# Patient Record
Sex: MALE | Race: OTHER | NOT HISPANIC OR LATINO
[De-identification: names, ages, dates, MRNs, and addresses within clinical notes are randomized per-mention and may not be internally consistent; named-entity substitution may affect disease eponyms.]

---

## 2022-01-12 ENCOUNTER — APPOINTMENT (OUTPATIENT)
Dept: UROLOGY | Facility: CLINIC | Age: 57
End: 2022-01-12
Payer: COMMERCIAL

## 2022-01-12 VITALS — BODY MASS INDEX: 29.73 KG/M2 | WEIGHT: 185 LBS | HEIGHT: 66 IN

## 2022-01-12 DIAGNOSIS — R53.83 OTHER FATIGUE: ICD-10-CM

## 2022-01-12 DIAGNOSIS — R79.89 OTHER SPECIFIED ABNORMAL FINDINGS OF BLOOD CHEMISTRY: ICD-10-CM

## 2022-01-12 PROCEDURE — 99203 OFFICE O/P NEW LOW 30 MIN: CPT

## 2022-01-12 NOTE — ASSESSMENT
[FreeTextEntry1] : testosterone normal\par can follow up with PMD to look up other reasons for low energy\par continue sildenfil with PMD\par PCa screening with PMD

## 2022-01-12 NOTE — HISTORY OF PRESENT ILLNESS
[FreeTextEntry1] : This is a 56 year male who has DM, presents for evaluation for low testosterone with low energy, low sex drive and weak erections\par \par on review of labs, testosterone is normal \par states some stress at home\par \par Oct 2021-- Total T = 606\par PSA = 1.0\par Glucose - 103\par Creatine - 0.97\par UA - no blood \par \par ED treated with sildenafil with sildenafil

## 2023-03-27 ENCOUNTER — APPOINTMENT (OUTPATIENT)
Dept: ORTHOPEDIC SURGERY | Facility: CLINIC | Age: 58
End: 2023-03-27
Payer: COMMERCIAL

## 2023-03-27 PROCEDURE — 99214 OFFICE O/P EST MOD 30 MIN: CPT

## 2023-03-27 NOTE — HISTORY OF PRESENT ILLNESS
[de-identified] : Chief complaint: LT shoulder pain\par \par 57 year old male presents with acute exacerbation of chronic LT shoulder pain. RT hand dominant. Denies any trauma. Occupation: [employer]. States he first started experiencing pain in 08/2022 but this resolved. He points to the deltoid as the location of pain. Describes pain with rest as a 3 and a 1 with activity on the pain scale. Admits to numbness/tingling in the fingers. Pain radiates to the neck. Taking OTC Tylenol for pain relief.  Tried some home exercises with no relief\par H/o diabetes, hypertension, and hypercholesterolemia. Taking metformin, losartan, tresiba, trulicity, simvastatin, and latanoprost. H/o LT collar bone fracture when he was a child. States his last H-A1C was 6.3 and he also has a glucose monitor. \par \par MRI of RT shoulder done with [facility] radiology, shows:\par 1. Full tear of the rotator cuff\par \par On exam left shoulder no tattoos, guarded range of motion pain and weakness with rotator cuff resistance positive impingement positive [place]'s left shoulder exam\par \par Recommending, discussed no treatment nonoperative and operative would like to have the surgery done he had an EKG at [facility] 3 weeks ago which was as was normal and we will schedule him for the left shoulder arthroscopy cuff repair decompression debridement possible open biceps tenodesis\par \par Surgical Discussion (general)\par \par The patient was advised of the diagnosis.  The natural history of the pathology was explained in full to the patient in layman's terms. All questions were answered.  The risks and benefits of surgical and non-surgical treatment alternatives were explained in full to the patient. \par \par The patient demonstrated a full understanding of the surgical and non-surgical options.  The risks of surgery were outlined in full to the patient including but not limited to bleeding, scarring, infection, sepsis, neurologic injury, vascular injury, failure to resolve symptoms, symptom recurrence, the need for further surgery, non-healing, wound breakdown, deep vein thrombosis, pulmonary embolism, spontaneous osteonecrosis, anesthesia complications and even death.  The patient understood all the risks and accepted them and understood that other complications could occur that are not mentioned above.  The intraoperative plan, post-operative plan, post-operative expectations and limitations were explained in full.  Expectations from non-surgical treatment were explained in full as well.  The patient demonstrated a complete understanding of the treatment alternatives and requested the above-mentioned procedure.  This will be scheduled accordingly.\par

## 2023-04-14 ENCOUNTER — APPOINTMENT (OUTPATIENT)
Dept: ORTHOPEDIC SURGERY | Facility: AMBULATORY SURGERY CENTER | Age: 58
End: 2023-04-14
Payer: COMMERCIAL

## 2023-04-14 PROCEDURE — 29827 SHO ARTHRS SRG RT8TR CUF RPR: CPT | Mod: LT

## 2023-04-14 PROCEDURE — 29826 SHO ARTHRS SRG DECOMPRESSION: CPT | Mod: LT

## 2023-04-14 PROCEDURE — 29823 SHO ARTHRS SRG XTNSV DBRDMT: CPT | Mod: LT,59

## 2023-04-14 RX ORDER — HYDROCODONE BITARTRATE AND ACETAMINOPHEN 5; 325 MG/1; MG/1
5-325 TABLET ORAL
Qty: 25 | Refills: 0 | Status: ACTIVE | COMMUNITY
Start: 2023-04-14 | End: 1900-01-01

## 2023-04-20 ENCOUNTER — APPOINTMENT (OUTPATIENT)
Dept: ORTHOPEDIC SURGERY | Facility: CLINIC | Age: 58
End: 2023-04-20

## 2023-04-28 ENCOUNTER — APPOINTMENT (OUTPATIENT)
Dept: ORTHOPEDIC SURGERY | Facility: CLINIC | Age: 58
End: 2023-04-28
Payer: COMMERCIAL

## 2023-04-28 PROCEDURE — 99024 POSTOP FOLLOW-UP VISIT: CPT

## 2023-04-28 NOTE — HISTORY OF PRESENT ILLNESS
[de-identified] : The patient is a 57-year-old male for reevaluation of his left shoulder.  He is status post left shoulder arthroscopy, arthroscopic rotator cuff repair, subacromial decompression and acromioplasty with extensive debridement of the glenohumeral joint, lysis of adhesions and complete synovectomy on 4/14/2023.  This is his initial postoperative visit.  His pain is well controlled.  He is wearing his sling.

## 2023-04-28 NOTE — IMAGING
[de-identified] : Physical exam of the left shoulder: The surgical portals are healing well.  Clean, dry, intact.  No surrounding erythema or drainage noted.  Range of motion was deferred today.  Intact to light touch and sensation.

## 2023-04-28 NOTE — DISCUSSION/SUMMARY
[de-identified] : Today the sutures were removed, Steri-Strips were placed.  His sling is discontinued.  He will start therapy for passive range of motion, pendulums and modalities.  I will see him back in 6 weeks for further evaluation and to advance his therapy to include rotator cuff and deltoid strengthening.\par \par Supervising Physician: Dr. Tay

## 2023-05-30 ENCOUNTER — RX RENEWAL (OUTPATIENT)
Age: 58
End: 2023-05-30

## 2023-06-12 ENCOUNTER — APPOINTMENT (OUTPATIENT)
Dept: ORTHOPEDIC SURGERY | Facility: CLINIC | Age: 58
End: 2023-06-12

## 2023-06-12 ENCOUNTER — APPOINTMENT (OUTPATIENT)
Dept: ORTHOPEDIC SURGERY | Facility: CLINIC | Age: 58
End: 2023-06-12
Payer: COMMERCIAL

## 2023-06-12 PROCEDURE — 99024 POSTOP FOLLOW-UP VISIT: CPT

## 2023-07-10 ENCOUNTER — NON-APPOINTMENT (OUTPATIENT)
Age: 58
End: 2023-07-10

## 2023-07-24 ENCOUNTER — NON-APPOINTMENT (OUTPATIENT)
Age: 58
End: 2023-07-24

## 2023-07-24 ENCOUNTER — APPOINTMENT (OUTPATIENT)
Dept: ORTHOPEDIC SURGERY | Facility: CLINIC | Age: 58
End: 2023-07-24
Payer: COMMERCIAL

## 2023-07-24 PROCEDURE — 99213 OFFICE O/P EST LOW 20 MIN: CPT

## 2023-07-24 RX ORDER — LATANOPROST/PF 0.005 %
0.01 DROPS OPHTHALMIC (EYE)
Qty: 8 | Refills: 0 | Status: ACTIVE | COMMUNITY
Start: 2023-05-28

## 2023-07-24 RX ORDER — VITAMIN B COMPLEX
CAPSULE ORAL
Qty: 90 | Refills: 0 | Status: ACTIVE | COMMUNITY
Start: 2023-04-19

## 2023-07-24 RX ORDER — BLOOD-GLUCOSE TRANSMITTER
EACH MISCELLANEOUS
Qty: 1 | Refills: 0 | Status: ACTIVE | COMMUNITY
Start: 2023-02-25

## 2023-07-24 RX ORDER — INSULIN DEGLUDEC INJECTION 100 U/ML
100 INJECTION, SOLUTION SUBCUTANEOUS
Qty: 15 | Refills: 0 | Status: ACTIVE | COMMUNITY
Start: 2023-06-26

## 2023-07-24 RX ORDER — BLOOD-GLUCOSE SENSOR
EACH MISCELLANEOUS
Qty: 3 | Refills: 0 | Status: ACTIVE | COMMUNITY
Start: 2022-08-29

## 2023-07-24 RX ORDER — PEN NEEDLE, DIABETIC 29 G X1/2"
32G X 4 MM NEEDLE, DISPOSABLE MISCELLANEOUS
Qty: 100 | Refills: 0 | Status: ACTIVE | COMMUNITY
Start: 2023-06-28

## 2023-07-24 NOTE — DISCUSSION/SUMMARY
[de-identified] : At this point I recommend he continues with formal physical therapy, Rx provided for that.  He will follow-up in 6 weeks for further evaluation with Dr. Tay.

## 2023-07-24 NOTE — IMAGING
[de-identified] : Physical exam of the left shoulder:  Active range of motion with forward flexion to 160 degrees, passive range of motion forward flexion to 170 degrees. Active range of motion abduction to 130 degrees, active range of motion internal rotation to L3.  No tenderness to palpation over the left AC joint or anterior aspect of the left shoulder.  Increased strength but still some weakness with rotator cuff resistance testing.

## 2023-07-24 NOTE — HISTORY OF PRESENT ILLNESS
[de-identified] : The patient is a 58-year-old male for reevaluation of his left shoulder. He is status post left shoulder arthroscopy, arthroscopic rotator cuff repair, subacromial decompression and acromioplasty with extensive debridement of the glenohumeral joint, lysis of adhesions and complete synovectomy on 4/14/2023.  He is a little over three months out from his surgery.  He gets stiffness in the shoulder.  He is doing formal physical therapy at [place].  The air conditioning bothers the shoulder.

## 2023-08-07 ENCOUNTER — RX RENEWAL (OUTPATIENT)
Age: 58
End: 2023-08-07

## 2023-09-11 ENCOUNTER — NON-APPOINTMENT (OUTPATIENT)
Age: 58
End: 2023-09-11

## 2023-09-11 ENCOUNTER — APPOINTMENT (OUTPATIENT)
Dept: ORTHOPEDIC SURGERY | Facility: CLINIC | Age: 58
End: 2023-09-11
Payer: COMMERCIAL

## 2023-09-11 DIAGNOSIS — S46.012D STRAIN OF MUSCLE(S) AND TENDON(S) OF THE ROTATOR CUFF OF LEFT SHOULDER, SUBSEQUENT ENCOUNTER: ICD-10-CM

## 2023-09-11 PROCEDURE — 99213 OFFICE O/P EST LOW 20 MIN: CPT

## 2023-10-30 ENCOUNTER — RX RENEWAL (OUTPATIENT)
Age: 58
End: 2023-10-30

## 2023-12-29 ENCOUNTER — RX RENEWAL (OUTPATIENT)
Age: 58
End: 2023-12-29

## 2023-12-29 RX ORDER — MELOXICAM 15 MG/1
15 TABLET ORAL DAILY
Qty: 30 | Refills: 1 | Status: ACTIVE | COMMUNITY
Start: 2023-04-14 | End: 1900-01-01

## 2024-01-31 ENCOUNTER — RESULT CHARGE (OUTPATIENT)
Age: 59
End: 2024-01-31

## 2024-02-01 ENCOUNTER — APPOINTMENT (OUTPATIENT)
Dept: CARDIOLOGY | Facility: CLINIC | Age: 59
End: 2024-02-01
Payer: COMMERCIAL

## 2024-02-01 VITALS — DIASTOLIC BLOOD PRESSURE: 88 MMHG | HEART RATE: 74 BPM | SYSTOLIC BLOOD PRESSURE: 138 MMHG

## 2024-02-01 DIAGNOSIS — Z00.00 ENCOUNTER FOR GENERAL ADULT MEDICAL EXAMINATION W/OUT ABNORMAL FINDINGS: ICD-10-CM

## 2024-02-01 DIAGNOSIS — R07.9 CHEST PAIN, UNSPECIFIED: ICD-10-CM

## 2024-02-01 PROCEDURE — 93000 ELECTROCARDIOGRAM COMPLETE: CPT

## 2024-02-01 PROCEDURE — 99204 OFFICE O/P NEW MOD 45 MIN: CPT | Mod: 25

## 2024-02-01 RX ORDER — LOSARTAN POTASSIUM 50 MG/1
50 TABLET, FILM COATED ORAL
Qty: 90 | Refills: 0 | Status: DISCONTINUED | COMMUNITY
Start: 2023-04-02 | End: 2024-02-01

## 2024-02-01 RX ORDER — AMOXICILLIN AND CLAVULANATE POTASSIUM 875; 125 MG/1; MG/1
875-125 TABLET, COATED ORAL
Qty: 20 | Refills: 0 | Status: DISCONTINUED | COMMUNITY
Start: 2023-03-07 | End: 2024-02-01

## 2024-02-01 RX ORDER — LOSARTAN POTASSIUM 100 MG/1
100 TABLET, FILM COATED ORAL
Qty: 30 | Refills: 0 | Status: DISCONTINUED | COMMUNITY
Start: 2023-07-08 | End: 2024-02-01

## 2024-02-01 NOTE — DISCUSSION/SUMMARY
[FreeTextEntry1] : get CTA as risk factors and vascular issues with ED  stop simvastatin  start rousvastatin 20 mg po qhs  bp now controlled continue meds.  2 d echo bloodowrk  [EKG obtained to assist in diagnosis and management of assessed problem(s)] : EKG obtained to assist in diagnosis and management of assessed problem(s)

## 2024-02-01 NOTE — HISTORY OF PRESENT ILLNESS
[FreeTextEntry1] : Pt with DM, HTN, HLD. erectile dysfunction Referred by GLORIA Robb for elevated bp.   pt bp has been labile at times, pt ekg with nsst changes. pt says bp has been high at times and was checked. pt denies cp or sob. pt complains fullness on left side at rest sometimes improve throughout the day. pt denies palpitations or lightheadedness. pt says occaionally wakes up with heartbeat pumping at times. no syncope.  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 pt is diabetic.  pt is on meloxicam for shoulder and surgery and advised to stop.  pt on simvastatin 40 mg po qhs

## 2024-02-23 ENCOUNTER — APPOINTMENT (OUTPATIENT)
Dept: CARDIOLOGY | Facility: CLINIC | Age: 59
End: 2024-02-23

## 2024-03-05 ENCOUNTER — APPOINTMENT (OUTPATIENT)
Dept: CARDIOLOGY | Facility: CLINIC | Age: 59
End: 2024-03-05
Payer: COMMERCIAL

## 2024-03-05 PROCEDURE — 93306 TTE W/DOPPLER COMPLETE: CPT

## 2024-03-07 ENCOUNTER — APPOINTMENT (OUTPATIENT)
Dept: CARDIOLOGY | Facility: CLINIC | Age: 59
End: 2024-03-07
Payer: COMMERCIAL

## 2024-03-07 VITALS — BODY MASS INDEX: 29.57 KG/M2 | WEIGHT: 184 LBS | HEIGHT: 66 IN

## 2024-03-07 VITALS — HEART RATE: 105 BPM | SYSTOLIC BLOOD PRESSURE: 124 MMHG | DIASTOLIC BLOOD PRESSURE: 74 MMHG

## 2024-03-07 DIAGNOSIS — R94.31 ABNORMAL ELECTROCARDIOGRAM [ECG] [EKG]: ICD-10-CM

## 2024-03-07 PROCEDURE — 99214 OFFICE O/P EST MOD 30 MIN: CPT

## 2024-03-07 RX ORDER — SIMVASTATIN 40 MG/1
40 TABLET, FILM COATED ORAL
Qty: 90 | Refills: 0 | Status: DISCONTINUED | COMMUNITY
Start: 2023-07-19 | End: 2024-03-07

## 2024-03-11 ENCOUNTER — OUTPATIENT (OUTPATIENT)
Dept: OUTPATIENT SERVICES | Facility: HOSPITAL | Age: 59
LOS: 1 days | End: 2024-03-11
Payer: COMMERCIAL

## 2024-03-11 ENCOUNTER — RESULT REVIEW (OUTPATIENT)
Age: 59
End: 2024-03-11

## 2024-03-11 DIAGNOSIS — Z00.8 ENCOUNTER FOR OTHER GENERAL EXAMINATION: ICD-10-CM

## 2024-03-11 DIAGNOSIS — R00.0 TACHYCARDIA, UNSPECIFIED: ICD-10-CM

## 2024-03-11 PROCEDURE — 75574 CT ANGIO HRT W/3D IMAGE: CPT

## 2024-03-11 PROCEDURE — 75574 CT ANGIO HRT W/3D IMAGE: CPT | Mod: 26

## 2024-03-12 ENCOUNTER — RESULT REVIEW (OUTPATIENT)
Age: 59
End: 2024-03-12

## 2024-03-12 DIAGNOSIS — R00.0 TACHYCARDIA, UNSPECIFIED: ICD-10-CM

## 2024-03-13 ENCOUNTER — OUTPATIENT (OUTPATIENT)
Dept: OUTPATIENT SERVICES | Facility: HOSPITAL | Age: 59
LOS: 1 days | End: 2024-03-13
Payer: COMMERCIAL

## 2024-03-13 DIAGNOSIS — R00.0 TACHYCARDIA, UNSPECIFIED: ICD-10-CM

## 2024-03-13 PROCEDURE — 75580 N-INVAS EST C FFR SW ALY CTA: CPT | Mod: 26

## 2024-03-13 PROCEDURE — 75580 N-INVAS EST C FFR SW ALY CTA: CPT

## 2024-03-14 ENCOUNTER — APPOINTMENT (OUTPATIENT)
Dept: ORTHOPEDIC SURGERY | Facility: CLINIC | Age: 59
End: 2024-03-14

## 2024-03-14 DIAGNOSIS — R00.0 TACHYCARDIA, UNSPECIFIED: ICD-10-CM

## 2024-03-18 RX ORDER — ASPIRIN ENTERIC COATED TABLETS 81 MG 81 MG/1
81 TABLET, DELAYED RELEASE ORAL
Qty: 90 | Refills: 3 | Status: ACTIVE | COMMUNITY
Start: 2024-03-18

## 2024-03-18 RX ORDER — METOPROLOL TARTRATE 100 MG/1
100 TABLET, FILM COATED ORAL
Qty: 2 | Refills: 0 | Status: DISCONTINUED | COMMUNITY
Start: 2024-02-01 | End: 2024-03-18

## 2024-03-18 RX ORDER — METOPROLOL SUCCINATE 25 MG/1
25 TABLET, EXTENDED RELEASE ORAL DAILY
Qty: 90 | Refills: 3 | Status: ACTIVE | COMMUNITY
Start: 2024-03-18 | End: 1900-01-01

## 2024-03-18 NOTE — DISCUSSION/SUMMARY
[FreeTextEntry1] : get CTA as risk factors and vascular issues with ED  look at aorta on CTA  stop simvastatin  continue rousvastatin 20 mg po qhs  get meds for bp

## 2024-03-18 NOTE — HISTORY OF PRESENT ILLNESS
[FreeTextEntry1] : Pt with DM, HTN, HLD. erectile dysfunction, tachycardia  pt bp has been labile at times, pt ekg with nsst changes. pt says bp has been high at times and was checked. pt denies cp or sob. pt complains fullness on left side at rest sometimes improve throughout the day. pt denies palpitations or lightheadedness. pt says occaionally wakes up with heartbeat pumping at times. no syncope.  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  pt is on meloxicam for shoulder and surgery and advised to stop.  pt on simvastatin 40 mg po qhs   3/7/24:  pt started rosuvastatin last visit.  CTA schedueled on monday.  echo 3/5/24: ST: 98 to 105, e' sept; 0.09 m/s, E/e': 6, LVOT VTI: 14.1 cm, CO: 3.3 l/min, LVEF 56%, Asc Ao: normal size but appears mildly aneursymal vs ST junction.  12/23: A1c: 8.2, LDL 50 on simvastatin prior to starting rosuvastatin in February.  3/5/24: EF: 56%, ST: 98 to 105, e' sept; 0.09 m/s, E/e': 6, LVOT VTI: 14.1 cm, CO: 3.3l/min, DD1, Asc Ao: 3.5 cm normal size but appears mild aneurysmal vs ST junction. hr at home between 98 to 123 at home. pt has CTA next week.   3/18/24: telephone  CTA: CAC: 1488, mLAd moderate narrowing, RCA mod narrowing, D1: mod to severe narrowing, DISTAL Lcx/om1 mod to severe narrowing.  start aspirin, metoprolol/ranexa.  pt says chest heaviness periodically.  will send to cardiac cath with dr. woodall.

## 2024-03-21 ENCOUNTER — OUTPATIENT (OUTPATIENT)
Dept: OUTPATIENT SERVICES | Facility: HOSPITAL | Age: 59
LOS: 1 days | End: 2024-03-21
Payer: COMMERCIAL

## 2024-03-21 VITALS
SYSTOLIC BLOOD PRESSURE: 138 MMHG | RESPIRATION RATE: 16 BRPM | DIASTOLIC BLOOD PRESSURE: 60 MMHG | TEMPERATURE: 97 F | OXYGEN SATURATION: 98 % | HEIGHT: 66 IN | WEIGHT: 182.98 LBS | HEART RATE: 82 BPM

## 2024-03-21 DIAGNOSIS — Z01.818 ENCOUNTER FOR OTHER PREPROCEDURAL EXAMINATION: ICD-10-CM

## 2024-03-21 DIAGNOSIS — Z90.49 ACQUIRED ABSENCE OF OTHER SPECIFIED PARTS OF DIGESTIVE TRACT: Chronic | ICD-10-CM

## 2024-03-21 DIAGNOSIS — Z98.890 OTHER SPECIFIED POSTPROCEDURAL STATES: Chronic | ICD-10-CM

## 2024-03-21 DIAGNOSIS — R94.31 ABNORMAL ELECTROCARDIOGRAM [ECG] [EKG]: ICD-10-CM

## 2024-03-21 LAB
ALBUMIN SERPL ELPH-MCNC: 4.4 G/DL — SIGNIFICANT CHANGE UP (ref 3.5–5.2)
ALP SERPL-CCNC: 87 U/L — SIGNIFICANT CHANGE UP (ref 30–115)
ALT FLD-CCNC: 11 U/L — SIGNIFICANT CHANGE UP (ref 0–41)
ANION GAP SERPL CALC-SCNC: 11 MMOL/L — SIGNIFICANT CHANGE UP (ref 7–14)
AST SERPL-CCNC: 17 U/L — SIGNIFICANT CHANGE UP (ref 0–41)
BASOPHILS # BLD AUTO: 0.04 K/UL — SIGNIFICANT CHANGE UP (ref 0–0.2)
BASOPHILS NFR BLD AUTO: 0.5 % — SIGNIFICANT CHANGE UP (ref 0–1)
BILIRUB SERPL-MCNC: 0.2 MG/DL — SIGNIFICANT CHANGE UP (ref 0.2–1.2)
BUN SERPL-MCNC: 15 MG/DL — SIGNIFICANT CHANGE UP (ref 10–20)
CALCIUM SERPL-MCNC: 9.6 MG/DL — SIGNIFICANT CHANGE UP (ref 8.4–10.5)
CHLORIDE SERPL-SCNC: 101 MMOL/L — SIGNIFICANT CHANGE UP (ref 98–110)
CO2 SERPL-SCNC: 26 MMOL/L — SIGNIFICANT CHANGE UP (ref 17–32)
CREAT SERPL-MCNC: 1.2 MG/DL — SIGNIFICANT CHANGE UP (ref 0.7–1.5)
EGFR: 71 ML/MIN/1.73M2 — SIGNIFICANT CHANGE UP
EOSINOPHIL # BLD AUTO: 0.19 K/UL — SIGNIFICANT CHANGE UP (ref 0–0.7)
EOSINOPHIL NFR BLD AUTO: 2.3 % — SIGNIFICANT CHANGE UP (ref 0–8)
GLUCOSE SERPL-MCNC: 106 MG/DL — HIGH (ref 70–99)
HCT VFR BLD CALC: 39.3 % — LOW (ref 42–52)
HGB BLD-MCNC: 13 G/DL — LOW (ref 14–18)
IMM GRANULOCYTES NFR BLD AUTO: 0.2 % — SIGNIFICANT CHANGE UP (ref 0.1–0.3)
LYMPHOCYTES # BLD AUTO: 2.34 K/UL — SIGNIFICANT CHANGE UP (ref 1.2–3.4)
LYMPHOCYTES # BLD AUTO: 28.3 % — SIGNIFICANT CHANGE UP (ref 20.5–51.1)
MCHC RBC-ENTMCNC: 29.7 PG — SIGNIFICANT CHANGE UP (ref 27–31)
MCHC RBC-ENTMCNC: 33.1 G/DL — SIGNIFICANT CHANGE UP (ref 32–37)
MCV RBC AUTO: 89.9 FL — SIGNIFICANT CHANGE UP (ref 80–94)
MONOCYTES # BLD AUTO: 0.54 K/UL — SIGNIFICANT CHANGE UP (ref 0.1–0.6)
MONOCYTES NFR BLD AUTO: 6.5 % — SIGNIFICANT CHANGE UP (ref 1.7–9.3)
NEUTROPHILS # BLD AUTO: 5.14 K/UL — SIGNIFICANT CHANGE UP (ref 1.4–6.5)
NEUTROPHILS NFR BLD AUTO: 62.2 % — SIGNIFICANT CHANGE UP (ref 42.2–75.2)
NRBC # BLD: 0 /100 WBCS — SIGNIFICANT CHANGE UP (ref 0–0)
PLATELET # BLD AUTO: 332 K/UL — SIGNIFICANT CHANGE UP (ref 130–400)
PMV BLD: 11 FL — HIGH (ref 7.4–10.4)
POTASSIUM SERPL-MCNC: 4.9 MMOL/L — SIGNIFICANT CHANGE UP (ref 3.5–5)
POTASSIUM SERPL-SCNC: 4.9 MMOL/L — SIGNIFICANT CHANGE UP (ref 3.5–5)
PROT SERPL-MCNC: 7.4 G/DL — SIGNIFICANT CHANGE UP (ref 6–8)
RBC # BLD: 4.37 M/UL — LOW (ref 4.7–6.1)
RBC # FLD: 14.2 % — SIGNIFICANT CHANGE UP (ref 11.5–14.5)
SODIUM SERPL-SCNC: 138 MMOL/L — SIGNIFICANT CHANGE UP (ref 135–146)
WBC # BLD: 8.27 K/UL — SIGNIFICANT CHANGE UP (ref 4.8–10.8)
WBC # FLD AUTO: 8.27 K/UL — SIGNIFICANT CHANGE UP (ref 4.8–10.8)

## 2024-03-21 PROCEDURE — 36415 COLL VENOUS BLD VENIPUNCTURE: CPT

## 2024-03-21 PROCEDURE — 93005 ELECTROCARDIOGRAM TRACING: CPT

## 2024-03-21 PROCEDURE — 93010 ELECTROCARDIOGRAM REPORT: CPT

## 2024-03-21 PROCEDURE — 80053 COMPREHEN METABOLIC PANEL: CPT

## 2024-03-21 PROCEDURE — 85025 COMPLETE CBC W/AUTO DIFF WBC: CPT

## 2024-03-21 PROCEDURE — 99214 OFFICE O/P EST MOD 30 MIN: CPT | Mod: 25

## 2024-03-21 NOTE — H&P PST ADULT - NSICDXPASTMEDICALHX_GEN_ALL_CORE_FT
PAST MEDICAL HISTORY:  History of angina     HTN (hypertension)     Increased pressure in the eye     Type 2 diabetes mellitus

## 2024-03-21 NOTE — H&P PST ADULT - HISTORY OF PRESENT ILLNESS
Patient is a 58  year old  male presenting to PAST in preparation for  CATHERIZATION  on3/27/24   nder sedation by Dr. Salazar .  pt w/ longstanding hx DM    reports ~ 1 yr  feeling on & off generalized chest pressure ("comes& goes") w/ occasional "pounding in my chest" no radiation. pt also underwent left shoulder surgery 4/2023 & was attributing symptoms to shoulder. saw PMD& referred to Cardio, CTA "showed blockage"    presently asymptomatic    Revised Cardiac Risk Index for Pre-Operative Risk from [employer]  on 3/21/2024  ** All calculations should be rechecked by clinician prior to use **    RESULT SUMMARY:  1 points  Class II Risk    6.0 %  30-day risk of death, MI, or cardiac arrest    From Ducemmett 2017. These numbers are higher than those from the original study (Yobani 1999). See Evidence for details.    INPUTS:  Elevated-risk surgery —> 0 = No  History of ischemic heart disease —> 0 = No  History of congestive heart failure —> 0 = No  History of cerebrovascular disease —> 0 = No  Pre-operative treatment with insulin —> 1 = Yes  Pre-operative creatinine >2 mg/dL / 176.8 µmol/L —> 0 = No    Duke Activity Status Index (DASI) from [email or web address]  on 3/21/2024  ** All calculations should be rechecked by clinician prior to use **    RESULT SUMMARY:  44.7 points  The higher the score (maximum 58.2), the higher the functional status.    8.23 METs      INPUTS:  Take care of self —> 2.75 = Yes  Walk indoors —> 1.75 = Yes  Walk 1&ndash;2 blocks on level ground —> 2.75 = Yes  Climb a flight of stairs or walk up a hill —> 5.5 = Yes  Run a short distance —> 8 = Yes  Do light work around the house —> 2.7 = Yes  Do moderate work around the house —> 3.5 = Yes  Do heavy work around the house —> 8 = Yes  Do yardwork —> 4.5 = Yes  Have sexual relations —> 5.25 = Yes  Participate in moderate recreational activities —> 0 = No  Participate in strenuous sports —> 0 = No

## 2024-03-21 NOTE — H&P PST ADULT - NSICDXPASTSURGICALHX_GEN_ALL_CORE_FT
PAST SURGICAL HISTORY:  H/O right knee surgery     History of cholecystectomy     History of hernia repair     S/P arthroscopy of left shoulder

## 2024-03-22 DIAGNOSIS — R94.31 ABNORMAL ELECTROCARDIOGRAM [ECG] [EKG]: ICD-10-CM

## 2024-03-22 DIAGNOSIS — Z01.818 ENCOUNTER FOR OTHER PREPROCEDURAL EXAMINATION: ICD-10-CM

## 2024-03-25 RX ORDER — TIRZEPATIDE 15 MG/.5ML
2.5 INJECTION, SOLUTION SUBCUTANEOUS
Refills: 0 | DISCHARGE

## 2024-03-27 ENCOUNTER — TRANSCRIPTION ENCOUNTER (OUTPATIENT)
Age: 59
End: 2024-03-27

## 2024-03-27 ENCOUNTER — OUTPATIENT (OUTPATIENT)
Dept: OUTPATIENT SERVICES | Facility: HOSPITAL | Age: 59
LOS: 1 days | Discharge: HOME | End: 2024-03-27
Payer: COMMERCIAL

## 2024-03-27 VITALS
RESPIRATION RATE: 16 BRPM | HEIGHT: 66 IN | OXYGEN SATURATION: 99 % | HEART RATE: 75 BPM | DIASTOLIC BLOOD PRESSURE: 77 MMHG | SYSTOLIC BLOOD PRESSURE: 117 MMHG | WEIGHT: 186.07 LBS

## 2024-03-27 VITALS
RESPIRATION RATE: 16 BRPM | DIASTOLIC BLOOD PRESSURE: 66 MMHG | SYSTOLIC BLOOD PRESSURE: 125 MMHG | HEART RATE: 70 BPM | OXYGEN SATURATION: 97 %

## 2024-03-27 DIAGNOSIS — I25.118 ATHEROSCLEROTIC HEART DISEASE OF NATIVE CORONARY ARTERY WITH OTHER FORMS OF ANGINA PECTORIS: ICD-10-CM

## 2024-03-27 DIAGNOSIS — Z90.49 ACQUIRED ABSENCE OF OTHER SPECIFIED PARTS OF DIGESTIVE TRACT: Chronic | ICD-10-CM

## 2024-03-27 DIAGNOSIS — Z98.890 OTHER SPECIFIED POSTPROCEDURAL STATES: Chronic | ICD-10-CM

## 2024-03-27 DIAGNOSIS — R94.31 ABNORMAL ELECTROCARDIOGRAM [ECG] [EKG]: ICD-10-CM

## 2024-03-27 DIAGNOSIS — I10 ESSENTIAL (PRIMARY) HYPERTENSION: ICD-10-CM

## 2024-03-27 DIAGNOSIS — R93.1 ABNORMAL FINDINGS ON DIAGNOSTIC IMAGING OF HEART AND CORONARY CIRCULATION: ICD-10-CM

## 2024-03-27 DIAGNOSIS — E78.5 HYPERLIPIDEMIA, UNSPECIFIED: ICD-10-CM

## 2024-03-27 LAB
ANION GAP SERPL CALC-SCNC: 11 MMOL/L — SIGNIFICANT CHANGE UP (ref 7–14)
BUN SERPL-MCNC: 17 MG/DL — SIGNIFICANT CHANGE UP (ref 10–20)
CALCIUM SERPL-MCNC: 9.8 MG/DL — SIGNIFICANT CHANGE UP (ref 8.4–10.5)
CHLORIDE SERPL-SCNC: 101 MMOL/L — SIGNIFICANT CHANGE UP (ref 98–110)
CO2 SERPL-SCNC: 27 MMOL/L — SIGNIFICANT CHANGE UP (ref 17–32)
CREAT SERPL-MCNC: 1.2 MG/DL — SIGNIFICANT CHANGE UP (ref 0.7–1.5)
EGFR: 71 ML/MIN/1.73M2 — SIGNIFICANT CHANGE UP
GLUCOSE SERPL-MCNC: 110 MG/DL — HIGH (ref 70–99)
HCT VFR BLD CALC: 36.1 % — LOW (ref 42–52)
HCT VFR BLD CALC: 40.7 % — LOW (ref 42–52)
HGB BLD-MCNC: 12.1 G/DL — LOW (ref 14–18)
HGB BLD-MCNC: 13.4 G/DL — LOW (ref 14–18)
MCHC RBC-ENTMCNC: 29.8 PG — SIGNIFICANT CHANGE UP (ref 27–31)
MCHC RBC-ENTMCNC: 29.8 PG — SIGNIFICANT CHANGE UP (ref 27–31)
MCHC RBC-ENTMCNC: 32.9 G/DL — SIGNIFICANT CHANGE UP (ref 32–37)
MCHC RBC-ENTMCNC: 33.5 G/DL — SIGNIFICANT CHANGE UP (ref 32–37)
MCV RBC AUTO: 88.9 FL — SIGNIFICANT CHANGE UP (ref 80–94)
MCV RBC AUTO: 90.6 FL — SIGNIFICANT CHANGE UP (ref 80–94)
NRBC # BLD: 0 /100 WBCS — SIGNIFICANT CHANGE UP (ref 0–0)
NRBC # BLD: 0 /100 WBCS — SIGNIFICANT CHANGE UP (ref 0–0)
PLATELET # BLD AUTO: 311 K/UL — SIGNIFICANT CHANGE UP (ref 130–400)
PLATELET # BLD AUTO: 364 K/UL — SIGNIFICANT CHANGE UP (ref 130–400)
PMV BLD: 10.1 FL — SIGNIFICANT CHANGE UP (ref 7.4–10.4)
PMV BLD: 9.8 FL — SIGNIFICANT CHANGE UP (ref 7.4–10.4)
POTASSIUM SERPL-MCNC: 4 MMOL/L — SIGNIFICANT CHANGE UP (ref 3.5–5)
POTASSIUM SERPL-SCNC: 4 MMOL/L — SIGNIFICANT CHANGE UP (ref 3.5–5)
RBC # BLD: 4.06 M/UL — LOW (ref 4.7–6.1)
RBC # BLD: 4.49 M/UL — LOW (ref 4.7–6.1)
RBC # FLD: 14.5 % — SIGNIFICANT CHANGE UP (ref 11.5–14.5)
RBC # FLD: 14.6 % — HIGH (ref 11.5–14.5)
SODIUM SERPL-SCNC: 139 MMOL/L — SIGNIFICANT CHANGE UP (ref 135–146)
WBC # BLD: 10.2 K/UL — SIGNIFICANT CHANGE UP (ref 4.8–10.8)
WBC # BLD: 9.44 K/UL — SIGNIFICANT CHANGE UP (ref 4.8–10.8)
WBC # FLD AUTO: 10.2 K/UL — SIGNIFICANT CHANGE UP (ref 4.8–10.8)
WBC # FLD AUTO: 9.44 K/UL — SIGNIFICANT CHANGE UP (ref 4.8–10.8)

## 2024-03-27 PROCEDURE — 36415 COLL VENOUS BLD VENIPUNCTURE: CPT

## 2024-03-27 PROCEDURE — C1769: CPT

## 2024-03-27 PROCEDURE — C1725: CPT

## 2024-03-27 PROCEDURE — C1874: CPT

## 2024-03-27 PROCEDURE — 93458 L HRT ARTERY/VENTRICLE ANGIO: CPT | Mod: 59

## 2024-03-27 PROCEDURE — 93458 L HRT ARTERY/VENTRICLE ANGIO: CPT | Mod: 26,XU

## 2024-03-27 PROCEDURE — C9600: CPT | Mod: LC

## 2024-03-27 PROCEDURE — C1894: CPT

## 2024-03-27 PROCEDURE — 0523T NTRAPX C FFR W/3D FUNCJL MAP: CPT

## 2024-03-27 PROCEDURE — 80048 BASIC METABOLIC PNL TOTAL CA: CPT

## 2024-03-27 PROCEDURE — C1887: CPT

## 2024-03-27 PROCEDURE — 85027 COMPLETE CBC AUTOMATED: CPT

## 2024-03-27 RX ORDER — RANOLAZINE 500 MG/1
0 TABLET, FILM COATED, EXTENDED RELEASE ORAL
Refills: 0 | DISCHARGE

## 2024-03-27 RX ORDER — METOPROLOL TARTRATE 50 MG
0 TABLET ORAL
Refills: 0 | DISCHARGE

## 2024-03-27 RX ORDER — ASPIRIN/CALCIUM CARB/MAGNESIUM 324 MG
0 TABLET ORAL
Refills: 0 | DISCHARGE

## 2024-03-27 RX ORDER — LATANOPROST 0.05 MG/ML
0 SOLUTION/ DROPS OPHTHALMIC; TOPICAL
Refills: 0 | DISCHARGE

## 2024-03-27 RX ORDER — METFORMIN HYDROCHLORIDE 850 MG/1
0 TABLET ORAL
Qty: 0 | Refills: 0 | DISCHARGE

## 2024-03-27 RX ORDER — CLOPIDOGREL BISULFATE 75 MG/1
1 TABLET, FILM COATED ORAL
Qty: 30 | Refills: 3
Start: 2024-03-27 | End: 2024-07-24

## 2024-03-27 RX ORDER — FAMOTIDINE 10 MG/ML
1 INJECTION INTRAVENOUS
Qty: 30 | Refills: 0
Start: 2024-03-27 | End: 2024-04-25

## 2024-03-27 RX ORDER — CHLORHEXIDINE GLUCONATE 213 G/1000ML
1 SOLUTION TOPICAL ONCE
Refills: 0 | Status: DISCONTINUED | OUTPATIENT
Start: 2024-03-27 | End: 2024-03-27

## 2024-03-27 RX ORDER — SODIUM CHLORIDE 9 MG/ML
250 INJECTION INTRAMUSCULAR; INTRAVENOUS; SUBCUTANEOUS ONCE
Refills: 0 | Status: COMPLETED | OUTPATIENT
Start: 2024-03-27 | End: 2024-03-27

## 2024-03-27 RX ORDER — LOSARTAN/HYDROCHLOROTHIAZIDE 100MG-25MG
0 TABLET ORAL
Refills: 0 | DISCHARGE

## 2024-03-27 RX ORDER — INSULIN DEGLUDEC 100 U/ML
25 INJECTION, SOLUTION SUBCUTANEOUS
Refills: 0 | DISCHARGE

## 2024-03-27 RX ORDER — ROSUVASTATIN CALCIUM 5 MG/1
0 TABLET ORAL
Refills: 0 | DISCHARGE

## 2024-03-27 RX ORDER — SODIUM CHLORIDE 9 MG/ML
1000 INJECTION INTRAMUSCULAR; INTRAVENOUS; SUBCUTANEOUS
Refills: 0 | Status: DISCONTINUED | OUTPATIENT
Start: 2024-03-27 | End: 2024-03-27

## 2024-03-27 RX ADMIN — SODIUM CHLORIDE 250 MILLILITER(S): 9 INJECTION INTRAMUSCULAR; INTRAVENOUS; SUBCUTANEOUS at 10:46

## 2024-03-27 NOTE — ASU DISCHARGE PLAN (ADULT/PEDIATRIC) - CARE PROVIDER_API CALL
Russel Valdez  Cardiology  [address] [place] [address]  Phone: [phone]  Fax: [phone]  Follow Up Time: 2 weeks

## 2024-03-27 NOTE — CHART NOTE - NSCHARTNOTEFT_GEN_A_CORE
PRE-OP DIAGNOSIS: Unstable Angina (AUC 8)    PROCEDURE:     [x] Coronary Angiogram   [x] [facility]     PRIMARY PHYSICIAN:  Dr. Salazar   FELLOW: Dr. Valerio      PROCEDURE DESCRIPTION:   Anesthesia: Local + Sedation     Access & Closure:   6-Fr Radial Artery => TR band     IV Contrast:  70 mL      ESTIMATED BLOOD LOSS: <10cc  SPECIMENS REMOVED: None    Intervention: PCI w/ balloon angioplasty and CJ x1 to proximal Ramus Intermedius   Implants: SYNERGY XD 2.5x16MM        FINDINGS:   DOMINANCE: Right     LM: Minor luminal irregularities     LAD: Mild-Moderate mid disease  D1: Moderate disease    RI: 85% proximal disease s/p PCI    CX: Minor luminal irregularities  OM1: Mild disease  OM2: Small vessel supplying small vascular territory. Severe diffuse disease  OM3: Severe diffuse disaease (FFR 0.60 by [employer] 3D mapping)     RCA: Mild mid disease. 70% distal disease.  PDA: Severe diffuse disease. (FFR 0.77 by [employer] 3D mapping)       LVEDP: 7 mmHg   EF:  55%     POST-OP DIAGNOSIS:  3-vessel CAD  Severe RI disease s/p PCI  Severe diffuse RPDA and OM2/3 disease - medical management           PLAN OF CARE:   [x] Post-procedure LVEDP-guided IV fluids: NS 150cc/hr x 3 hours   [x] Medications: ASA/Brilinta. High intensity statin. C/w BB/Ranolazine for anti-anginal therapy      DISPOSITION:  [x] D/C Home Today PRE-OP DIAGNOSIS: Unstable Angina (AUC 8)    PROCEDURE:     [x] Coronary Angiogram   [x] [facility]     PRIMARY PHYSICIAN:  Dr. Salazar   FELLOW: Dr. Valerio      PROCEDURE DESCRIPTION:   Anesthesia: Local + Sedation     Access & Closure:   6-Fr Radial Artery => TR band     IV Contrast:  70 mL      ESTIMATED BLOOD LOSS: <10cc  SPECIMENS REMOVED: None    Intervention: PCI w/ balloon angioplasty and CJ x1 to proximal Ramus Intermedius   Implants: SYNERGY XD 2.5x16MM        FINDINGS:   DOMINANCE: Right     LM: Minor luminal irregularities     LAD: Mild-Moderate mid disease  D1: Moderate disease    RI: 85% proximal disease s/p PCI    CX: Minor luminal irregularities  OM1: Mild disease  OM2: Small vessel supplying small vascular territory. Severe diffuse disease  OM3: Severe diffuse disaease (FFR 0.60 by [employer] 3D mapping)     RCA: Mild mid disease. 70% distal disease.  PDA: Severe diffuse disease. (FFR 0.77 by [employer]works 3D mapping)       LVEDP: 7 mmHg   EF:  55%     POST-OP DIAGNOSIS:  3-vessel CAD  Severe RI disease s/p PCI  Severe diffuse RPDA and OM2/3 disease - medical management           PLAN OF CARE:   [x] Post-procedure LVEDP-guided IV fluids: NS 150cc/hr x 3 hours   [x] Medications: ASA/Plavix . High intensity statin. C/w BB/Ranolazine for anti-anginal therapy      DISPOSITION:  [x] D/C Home Today PRE-OP DIAGNOSIS: Unstable Angina (AUC 8)    PROCEDURE:     [x] Coronary Angiogram   [x] [facility]     PRIMARY PHYSICIAN:  Dr. Salazar   FELLOW: Dr. Valerio      PROCEDURE DESCRIPTION:   Anesthesia: Local + Sedation     Access & Closure:   6-Fr Radial Artery => TR band     IV Contrast:  70 mL      ESTIMATED BLOOD LOSS: <10cc  SPECIMENS REMOVED: None    Intervention: PCI w/ balloon angioplasty and CJ x1 to proximal Ramus Intermedius   Implants: SYNERGY XD 2.5x16MM        FINDINGS:   DOMINANCE: Right     LM: Minor luminal irregularities     LAD: Mild-Moderate mid disease  D1: Moderate disease    RI: 95% proximal disease s/p PCI    CX: Minor luminal irregularities  OM1: Mild disease  OM2: Small vessel supplying small vascular territory. Severe diffuse disease  OM3: Severe diffuse disease (FFR 0.60 by [employer]works 3D mapping)     RCA: Mild mid disease. 70% distal disease.  PDA: Severe diffuse disease. (FFR 0.77 by [employer] 3D mapping)       LVEDP: 7 mmHg   EF:  55%     POST-OP DIAGNOSIS:  3-vessel CAD  Severe RI disease s/p PCI  Severe diffuse RPDA and OM2/3 disease - medical management           PLAN OF CARE:   [x] Post-procedure LVEDP-guided IV fluids: NS 150cc/hr x 3 hours   [x] Medications: ASA/Plavix . High intensity statin. C/w BB/Ranolazine for anti-anginal therapy      DISPOSITION:  [x] D/C Home Today

## 2024-03-27 NOTE — ASU DISCHARGE PLAN (ADULT/PEDIATRIC) - ASU DC SPECIAL INSTRUCTIONSFT
Discharge Instructions as follows:  - Continue medical regimen as prescribed to prevent chest pain.  - If you are diabetic and taking medication containing Metformin, do not take them for 48 hours after the procedure  - Continue dual anti-platelet therapy, beta blocker, Statin, and Ranexa  - Instructed to call 911 if chest pain, shortness of breath or bleeding from access site.  - No heavy lifting > 10lbs x 1 week.  - No driving x 24 hours.  - No baths, swimming pools x 1 week, may shower  - Low sodium low fat low cholesterol diet  - Follow-up with Cardiologist in 1-2 weeks after discharge  - Soreness or tenderness at the site is possible, it will diminish over time. You may take Tylenol every 4-6 hours as needed. Nothing stronger is needed. NO Motrin/Ibuprofen  - Any questions call cardiac cath lab [phone]

## 2024-03-27 NOTE — ASU DISCHARGE PLAN (ADULT/PEDIATRIC) - NS MD DC FALL RISK RISK
For information on Fall & Injury Prevention, visit: [email or web address] OR  [email or web address] OR  https://www.cdc.gov/steadi/patient.html

## 2024-03-27 NOTE — PROGRESS NOTE ADULT - SUBJECTIVE AND OBJECTIVE BOX
Interventional Cardiology Discharge Note:  s/p PCI/CJ OM1    SANDOVAL SHAFFER   58y Male    PAST MEDICAL & SURGICAL HISTORY:  Type 2 diabetes mellitus    History of angina    HTN (hypertension)    Increased pressure in the eye    S/P arthroscopy of left shoulder    History of cholecystectomy    History of hernia repair    H/O right knee surgery        HPI:  59 y/o M, former smoker, with PMHx of HTN, HLD, DM-II, who presented to his cardiologist with c/o intermittent left sided chest discomfort occurring independent of any activity. Denies any SOB, dizziness, n/v, diaphoresis, orthopnea, PND, LE edema, palpitations, syncope.  Pt underwent a CCTA on 3/11/24 which revealed a Calcium score of 1488, multifocal calcified plaque throughout the RCA, proximal and mid LAD resulting in up to moderate narrowing, mixed calcified and noncalcified plaque origin proximal segment of the first diagonal branch resulting in moderate to severe narrowing, and calcified and noncalcified plaque throughout the mid to distal LCx and first obtuse marginal branch resulting in up to moderate to severe narrowing.  Pt also had abnormal CT FFR.  Pt is now referred for LHC with possible intervention if clinically indicated.   3/27 cath: s/p PCI/CJ OM1    Allergies    No Known Allergies      Patient seen and examined at bedside.   Patient without complaints.   Denies CP, SOB, palpitations, or dizziness    Vital Signs Last 24 Hrs  HR: 75 (27 Mar 2024 10:39) (75 - 75)  BP: 117/77 (27 Mar 2024 10:39) (117/77 - 117/77)  RR: 16 (27 Mar 2024 10:39) (16 - 16)  SpO2: 99% (27 Mar 2024 10:39) (99% - 99%)      MEDICATIONS  (STANDING):  chlorhexidine 4% Liquid 1 Application(s) Topical once  sodium chloride 0.9%. 1000 milliLiter(s) (150 mL/Hr) IV Continuous <Continuous>      REVIEW OF SYSTEMS:          All negative except as mentioned in HPI    PHYSICAL EXAM:           CONSTITUTIONAL: Well-developed; well-nourished; in no acute distress  	SKIN: warm, dry  	HEAD: Normocephalic; atraumatic  	EYES: PERRL.  	ENT: No nasal discharge, airway clear, mucous membranes moist  	NECK: Supple; non tender.  	CARD: +S1, +S2, no murmurs, gallops, or rubs. Regular rate and rhythm    	RESP: No wheezes, rales or rhonchi. CTA B/L  	ABD: soft ntnd, + BS x 4 quadrants  	EXT: moves all extremities,  no clubbing, cyanosis or edema  	NEURO: Alert and oriented x3, no focal deficits          PSYCH: Cooperative, appropriate          VASCULAR:  + Rad / + PTs / + DPs          EXTREMITY:            	   Right Radial: TR band in place, access site soft, no hematoma, no pain, + pulses, no sign of infection, no numbness            ECG: pending    LABS:                        13.4   10.20 )-----------( 364      ( 27 Mar 2024 10:46 )             40.7     03-27    139  |  101  |  17  ----------------------------<  110<H>  4.0   |  27  |  1.2    Ca    9.8      27 Mar 2024 10:46      A/P:  I discussed the case with Cardiologist Dr. Salazar and recommend the following:    S/P PCI:    PCI/CJ OM1    	         Continue DAPT ( Aspirin 81 mg PO Daily and Plavix 75mg po daily),  B-Blocker, Statin Therapy                   Patient given 30 day supply of ( Aspirin 81 mg daily and Plavix 75 mg daily ) to take at home                   **GI prophylaxis with Pepcid 20mg (sent to pharmacy)                   CBC @ 17:30                   EKG prior to d/c @ 17:30                   NS @ 150 ml/hr x 6hrs                   **OOB to chair/Ambulate with assistance                   Monitor access site/ distal pulses                   Patient agreeing to take DAPT for at least one year or as directed by cardiologist                    Pt given instructions on importance of taking antiplatelet medication or risk acute stent thrombosis/death                   Post cath instructions, access site care and activity restrictions reviewed with patient                     Discussed with patient to return to hospital if experience chest pain, shortness breath, dizziness and site bleeding                   Aggressive risk factor modification, diet counseling, smoking cessation discussed with patient                    Benefits of cardiac rehab discussed with patient.                   Cardiac Rehab info provided /Referral and communication to cardiac rehab completed.                   Patient instructed to call cardiac rehab and make initial appointment after first follow-up visit with Cardiologist                    Can discharge patient @ 19:30 from cardiac standpoint after ambulating without symptoms, access site wnl, labs and ECG reviewed                    Follow up with Cardiology Dr. Valdez in two weeks.  Instructed to call and make an appointment                                       Interventional Cardiology Discharge Note:  s/p PCI/CJ pRamus    SANDOVAL SHAFFER   58y Male    PAST MEDICAL & SURGICAL HISTORY:  Type 2 diabetes mellitus    History of angina    HTN (hypertension)    Increased pressure in the eye    S/P arthroscopy of left shoulder    History of cholecystectomy    History of hernia repair    H/O right knee surgery        HPI:  59 y/o M, former smoker, with PMHx of HTN, HLD, DM-II, who presented to his cardiologist with c/o intermittent left sided chest discomfort occurring independent of any activity. Denies any SOB, dizziness, n/v, diaphoresis, orthopnea, PND, LE edema, palpitations, syncope.  Pt underwent a CCTA on 3/11/24 which revealed a Calcium score of 1488, multifocal calcified plaque throughout the RCA, proximal and mid LAD resulting in up to moderate narrowing, mixed calcified and noncalcified plaque origin proximal segment of the first diagonal branch resulting in moderate to severe narrowing, and calcified and noncalcified plaque throughout the mid to distal LCx and first obtuse marginal branch resulting in up to moderate to severe narrowing.  Pt also had abnormal CT FFR.  Pt is now referred for LHC with possible intervention if clinically indicated.   3/27 cath: s/p PCI/CJ pRamus    Allergies    No Known Allergies      Patient seen and examined at bedside.   Patient without complaints.   Denies CP, SOB, palpitations, or dizziness    Vital Signs Last 24 Hrs  HR: 75 (27 Mar 2024 10:39) (75 - 75)  BP: 117/77 (27 Mar 2024 10:39) (117/77 - 117/77)  RR: 16 (27 Mar 2024 10:39) (16 - 16)  SpO2: 99% (27 Mar 2024 10:39) (99% - 99%)      MEDICATIONS  (STANDING):  chlorhexidine 4% Liquid 1 Application(s) Topical once  sodium chloride 0.9%. 1000 milliLiter(s) (150 mL/Hr) IV Continuous <Continuous>      REVIEW OF SYSTEMS:          All negative except as mentioned in HPI    PHYSICAL EXAM:           CONSTITUTIONAL: Well-developed; well-nourished; in no acute distress  	SKIN: warm, dry  	HEAD: Normocephalic; atraumatic  	EYES: PERRL.  	ENT: No nasal discharge, airway clear, mucous membranes moist  	NECK: Supple; non tender.  	CARD: +S1, +S2, no murmurs, gallops, or rubs. Regular rate and rhythm    	RESP: No wheezes, rales or rhonchi. CTA B/L  	ABD: soft ntnd, + BS x 4 quadrants  	EXT: moves all extremities,  no clubbing, cyanosis or edema  	NEURO: Alert and oriented x3, no focal deficits          PSYCH: Cooperative, appropriate          VASCULAR:  + Rad / + PTs / + DPs          EXTREMITY:            	   Right Radial: TR band in place, access site soft, no hematoma, no pain, + pulses, no sign of infection, no numbness            ECG: pending    LABS:                        13.4   10.20 )-----------( 364      ( 27 Mar 2024 10:46 )             40.7     03-27    139  |  101  |  17  ----------------------------<  110<H>  4.0   |  27  |  1.2    Ca    9.8      27 Mar 2024 10:46      A/P:  I discussed the case with Cardiologist Dr. Salazar and recommend the following:    S/P PCI:    Intervention: PCI w/ balloon angioplasty and CJ x1 to proximal Ramus Intermedius   Implants: SYNERGY XD 2.5x16MM    	         Continue DAPT ( Aspirin 81 mg PO Daily and Plavix 75mg po daily),  B-Blocker, Statin Therapy                   Patient given 30 day supply of ( Aspirin 81 mg daily and Plavix 75 mg daily ) to take at home                   **GI prophylaxis with Pepcid 20mg (sent to pharmacy)                   CBC @ 17:30                   EKG prior to d/c @ 17:30                   NS @ 150 ml/hr x 6hrs                   **OOB to chair/Ambulate with assistance                   Monitor access site/ distal pulses                   Patient agreeing to take DAPT for at least one year or as directed by cardiologist                    Pt given instructions on importance of taking antiplatelet medication or risk acute stent thrombosis/death                   Post cath instructions, access site care and activity restrictions reviewed with patient                     Discussed with patient to return to hospital if experience chest pain, shortness breath, dizziness and site bleeding                   Aggressive risk factor modification, diet counseling, smoking cessation discussed with patient                    Benefits of cardiac rehab discussed with patient.                   Cardiac Rehab info provided /Referral and communication to cardiac rehab completed.                   Patient instructed to call cardiac rehab and make initial appointment after first follow-up visit with Cardiologist                    Can discharge patient @ 19:30 from cardiac standpoint after ambulating without symptoms, access site wnl, labs and ECG reviewed                    Follow up with Cardiology Dr. Valdez in two weeks.  Instructed to call and make an appointment

## 2024-03-27 NOTE — CHART NOTE - NSCHARTNOTEFT_GEN_A_CORE
PREOPERATIVE DAY OF PROCEDURE EVALUATION:  I have personally seen and examined the patient.  I agree with the history and physical which I have reviewed and noted any changes below.     59 y/o M, former smoker, with PMHx of HTN, HLD, DM-II, who presented to his cardiologist with c/o intermittent left sided chest discomfort occurring independent of any activity. Denies any SOB, dizziness, n/v, diaphoresis, orthopnea, PND, LE edema, palpitations, syncope.  Pt underwent a CCTA on 3/11/24 which revealed a Calcium score of 1488, multifocal calcified plaque throughout the RCA, proximal and mid LAD resulting in up to moderate narrowing, mixed calcified and noncalcified plaque origin proximal segment of the first diagonal branch resulting in moderate to severe narrowing, and calcified and noncalcified plaque throughout the mid to distal LCx and first obtuse marginal branch resulting in up to moderate to severe narrowing.  Pt also had CT FFR (see below).  Pt is now referred for [facility] with possible intervention if clinically indicated.     CT FFR:   Left Main: CT-FFR extending to the distal segment with a value of  0.99    LAD: CT-FFR extending to the distal segment with a value of 0.72 with   evidence of hemodynamically significant focal lesion at the distal aspect    LCX: CT-FFR extending with a value of 0.95 mid first obtuse marginal   branch and 0.98 midLCx. CT FFR is not available for the remainder of the   vessels    RCA: CT-FFR extending to the distal segment with a value of 0.84        Bleeding Risk Score:  0.7%  IVF pre-hydration: 250cc NS bolus  -on daily baby ASA, took it this am  -on BB and Ranexa, statin    /77, 72, 16, 99% on RA  Right Wilver: positive   3/21 ECG: NSR @ 82bpm         (Signed electronically by __________)  03-27-24 @ 10:47

## 2024-04-11 ENCOUNTER — APPOINTMENT (OUTPATIENT)
Dept: CARDIOLOGY | Facility: CLINIC | Age: 59
End: 2024-04-11
Payer: COMMERCIAL

## 2024-04-11 VITALS — DIASTOLIC BLOOD PRESSURE: 80 MMHG | SYSTOLIC BLOOD PRESSURE: 130 MMHG | HEART RATE: 80 BPM

## 2024-04-11 VITALS — HEIGHT: 66 IN | WEIGHT: 176 LBS | BODY MASS INDEX: 28.28 KG/M2

## 2024-04-11 DIAGNOSIS — N52.01 ERECTILE DYSFUNCTION DUE TO ARTERIAL INSUFFICIENCY: ICD-10-CM

## 2024-04-11 DIAGNOSIS — R00.0 TACHYCARDIA, UNSPECIFIED: ICD-10-CM

## 2024-04-11 DIAGNOSIS — I25.10 ATHEROSCLEROTIC HEART DISEASE OF NATIVE CORONARY ARTERY W/OUT ANGINA PECTORIS: ICD-10-CM

## 2024-04-11 DIAGNOSIS — E78.2 MIXED HYPERLIPIDEMIA: ICD-10-CM

## 2024-04-11 DIAGNOSIS — I10 ESSENTIAL (PRIMARY) HYPERTENSION: ICD-10-CM

## 2024-04-11 DIAGNOSIS — E11.21 TYPE 2 DIABETES MELLITUS WITH DIABETIC NEPHROPATHY: ICD-10-CM

## 2024-04-11 DIAGNOSIS — Z98.61 CORONARY ANGIOPLASTY STATUS: ICD-10-CM

## 2024-04-11 DIAGNOSIS — R93.1 ABNORMAL FINDINGS ON DIAGNOSTIC IMAGING OF HEART AND CORONARY CIRCULATION: ICD-10-CM

## 2024-04-11 PROBLEM — Z86.79 PERSONAL HISTORY OF OTHER DISEASES OF THE CIRCULATORY SYSTEM: Chronic | Status: ACTIVE | Noted: 2024-03-21

## 2024-04-11 PROBLEM — H40.059 OCULAR HYPERTENSION, UNSPECIFIED EYE: Chronic | Status: ACTIVE | Noted: 2024-03-21

## 2024-04-11 PROBLEM — E11.9 TYPE 2 DIABETES MELLITUS WITHOUT COMPLICATIONS: Chronic | Status: ACTIVE | Noted: 2024-03-21

## 2024-04-11 PROCEDURE — G2211 COMPLEX E/M VISIT ADD ON: CPT

## 2024-04-11 PROCEDURE — 99214 OFFICE O/P EST MOD 30 MIN: CPT

## 2024-04-11 RX ORDER — RANOLAZINE 500 MG/1
500 TABLET, EXTENDED RELEASE ORAL
Qty: 60 | Refills: 5 | Status: DISCONTINUED | COMMUNITY
Start: 2024-03-18 | End: 2024-04-11

## 2024-04-11 RX ORDER — NITROGLYCERIN 0.4 MG/1
0.4 TABLET SUBLINGUAL
Qty: 100 | Refills: 2 | Status: ACTIVE | COMMUNITY
Start: 2024-04-11 | End: 1900-01-01

## 2024-04-11 RX ORDER — CLOPIDOGREL BISULFATE 75 MG/1
75 TABLET, FILM COATED ORAL DAILY
Qty: 30 | Refills: 5 | Status: ACTIVE | COMMUNITY
Start: 2024-04-11 | End: 1900-01-01

## 2024-05-10 ENCOUNTER — APPOINTMENT (OUTPATIENT)
Dept: ORTHOPEDIC SURGERY | Facility: CLINIC | Age: 59
End: 2024-05-10
Payer: COMMERCIAL

## 2024-05-10 PROCEDURE — 99213 OFFICE O/P EST LOW 20 MIN: CPT

## 2024-05-10 PROCEDURE — 73562 X-RAY EXAM OF KNEE 3: CPT | Mod: 50

## 2024-05-10 RX ORDER — FAMOTIDINE 20 MG/1
20 TABLET, FILM COATED ORAL
Qty: 90 | Refills: 3 | Status: ACTIVE | COMMUNITY
Start: 2024-05-10 | End: 1900-01-01

## 2024-05-10 NOTE — HISTORY OF PRESENT ILLNESS
[de-identified] : Patient here for evaluation left knee pain. Complains of joint line pain Positive mechanical symptoms and catching  is scheduled for an mri on monday by his PMD  NAD Left knee No skin breakdown Medial joint line ttp Positive andreea Negative lachman Negative varus/valgus instability ROM 0-130 Pain with forced extension and flexion NVI Compartments soft and NT  Xray reviewed and significant for left knee mild degenerative changes  Plan went over findings explained the imaging and possible MM tear will fu with mri

## 2024-05-16 ENCOUNTER — APPOINTMENT (OUTPATIENT)
Dept: ORTHOPEDIC SURGERY | Facility: CLINIC | Age: 59
End: 2024-05-16
Payer: COMMERCIAL

## 2024-05-16 DIAGNOSIS — M25.562 PAIN IN LEFT KNEE: ICD-10-CM

## 2024-05-16 PROCEDURE — 99214 OFFICE O/P EST MOD 30 MIN: CPT

## 2024-05-16 NOTE — HISTORY OF PRESENT ILLNESS
[de-identified] : Patient here for evaluation left knee pain. Complains of joint line pain Positive mechanical symptoms and catching  NAD Left knee No skin breakdown Medial joint line ttp Positive andreea Negative lachman Negative varus/valgus instability ROM 0-130 Pain with forced extension and flexion NVI Compartments soft and NT  Xray reviewed and significant for left knee mild degenerative changes  mri left knee: mmt  Plan went over findings explained the mri in detail op vs nonop explained will porceed with surgery  left knee arthroscopy, medial meniscectomy  Operative and nonoperative options discussed with patient. Surgical risks, benefits, and alternatives explained. Surgical risks include but are not exclusive to bleeding, infection, neurovascular damage, continued pain, stiffness, scarring, rsd, dvt/pe, potential failure of surgery that may require further surgery in the future. I went over incisions and rehabilitation. All questions answered.

## 2024-05-23 NOTE — DISCUSSION/SUMMARY
[FreeTextEntry1] : 70% RCA ffr: 0.77 (small vessel) d/w dr. woodall small vessel - PCI if severe symptoms aorta normal on CTA 2024  continue rosuvastatin 20 mg po qhs  cont aspirin 81  increase metoprolol er to 100 mg daily  STOP RANOLAZINE 3/24 If angina then will start isosorbide  give sl ntg.  get bloodwork/ carotid/4 months

## 2024-05-23 NOTE — HISTORY OF PRESENT ILLNESS
[FreeTextEntry1] : Pt with Cath 3/27/24: OM1 S/P PCI, RCA 70% (GDMT unless symptoms)  DM, HTN, HLD. erectile dysfunction, tachycardia  pt bp has been labile, pt ekg with nsst changes. pt says bp has been high at times and was checked. pt denies cp or sob. pt complains fullness on left side at rest sometimes improve throughout the day. pt denies palpitations or lightheadedness. pt says occaionally wakes up with heartbeat pumping at times. no syncope.  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  pt is on meloxicam for shoulder and surgery and advised to stop.  pt on simvastatin 40 mg po qhs   3/7/24:  pt started rosuvastatin last visit.  CTA schedueled on monday.  echo 3/5/24: ST: 98 to 105, e' sept; 0.09 m/s, E/e': 6, LVOT VTI: 14.1 cm, CO: 3.3 l/min, LVEF 56%, Asc Ao: normal size but appears mildly aneursymal vs ST junction.  12/23: A1c: 8.2, LDL 50 on simvastatin prior to starting rosuvastatin in February.  3/5/24: EF: 56%, ST: 98 to 105, e' sept; 0.09 m/s, E/e': 6, LVOT VTI: 14.1 cm, CO: 3.3l/min, DD1, Asc Ao: 3.5 cm normal size but appears mild aneurysmal vs ST junction. hr at home between 98 to 123 at home. pt has CTA next week.   4/11/24:  3/18/24: telephone  CTA: CAC: 1488, mLAd moderate narrowing, RCA mod narrowing, D1: mod to severe narrowing, DISTAL Lcx/om1 mod to severe narrowing.  start aspirin, metoprolol/ranexa.  pt says chest heaviness periodically.  will send to cardiac cath with dr. woodall.  3/27/24: CATH: 90% OM1 s/p PCI, 70%RCA ffr: 0.77 (small vessel) d/w dr. woodall small vessel - PCI if severe symptoms. mLAD: 50%, D1: 50%  pt says very little pressure, PT SAYS RANOLAZINE MAKING HIM FEEL YUCKY. wrist is stable.   5/23/24: As per Dr. Ceja patient can have knee surgery on DAPT Aspirin and Plavix. Patient made aware.

## 2024-05-24 ENCOUNTER — NON-APPOINTMENT (OUTPATIENT)
Age: 59
End: 2024-05-24

## 2024-05-31 ENCOUNTER — APPOINTMENT (OUTPATIENT)
Dept: ORTHOPEDIC SURGERY | Facility: CLINIC | Age: 59
End: 2024-05-31

## 2024-07-01 ENCOUNTER — RX RENEWAL (OUTPATIENT)
Age: 59
End: 2024-07-01

## 2024-07-31 ENCOUNTER — APPOINTMENT (OUTPATIENT)
Dept: CARDIOLOGY | Facility: CLINIC | Age: 59
End: 2024-07-31

## 2024-07-31 PROCEDURE — 93880 EXTRACRANIAL BILAT STUDY: CPT

## 2024-08-08 ENCOUNTER — NON-APPOINTMENT (OUTPATIENT)
Age: 59
End: 2024-08-08

## 2024-08-09 ENCOUNTER — APPOINTMENT (OUTPATIENT)
Dept: CARDIOLOGY | Facility: CLINIC | Age: 59
End: 2024-08-09

## 2024-08-09 PROCEDURE — G2211 COMPLEX E/M VISIT ADD ON: CPT | Mod: NC

## 2024-08-09 PROCEDURE — 99214 OFFICE O/P EST MOD 30 MIN: CPT

## 2024-08-09 NOTE — HISTORY OF PRESENT ILLNESS
[FreeTextEntry1] : Pt with Cath 3/27/24: OM1 S/P PCI, RCA 70% (GDMT unless symptoms) DM, HTN, HLD. erectile dysfunction, tachycardia  pt bp has been labile, pt ekg with nsst changes. pt says bp has been high at times and was checked. pt denies cp or sob. pt complains fullness on left side at rest sometimes improve throughout the day. pt denies palpitations or lightheadedness. pt says occaionally wakes up with heartbeat pumping at times. no syncope.  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  pt is on meloxicam for shoulder and surgery and advised to stop.  pt on simvastatin 40 mg po qhs   3/7/24:  pt started rosuvastatin last visit.  CTA schedueled on monday.  echo 3/5/24: ST: 98 to 105, e' sept; 0.09 m/s, E/e': 6, LVOT VTI: 14.1 cm, CO: 3.3 l/min, LVEF 56%, Asc Ao: normal size but appears mildly aneursymal vs ST junction.  12/23: A1c: 8.2, LDL 50 on simvastatin prior to starting rosuvastatin in February.  3/5/24: EF: 56%, ST: 98 to 105, e' sept; 0.09 m/s, E/e': 6, LVOT VTI: 14.1 cm, CO: 3.3l/min, DD1, Asc Ao: 3.5 cm normal size but appears mild aneurysmal vs ST junction. hr at home between 98 to 123 at home. pt has CTA next week.   4/11/24:  3/18/24: telephone  CTA: CAC: 1488, mLAd moderate narrowing, RCA mod narrowing, D1: mod to severe narrowing, DISTAL Lcx/om1 mod to severe narrowing.  start aspirin, metoprolol/ranexa.  pt says chest heaviness periodically.  will send to cardiac cath with dr. woodall.  3/27/24: CATH: 90% OM1 s/p PCI, 70%RCA ffr: 0.77 (small vessel) d/w dr. woodall small vessel - PCI if severe symptoms. mLAD: 50%, D1: 50%  pt says very little pressure, PT SAYS RANOLAZINE MAKING HIM FEEL YUCKY. wrist is stable.   5/23/24: As per Dr. Ceja patient can have knee surgery on DAPT Aspirin and Plavix. Patient made aware.   8/9/24:  metoprolol increased to 100 mg daily last visit  7/24: carotid: less than 50% b/l ica.  pt says does not feels sluggish and tired and had metoprolol er 100 mg daily, pt hr is improved. pt denies cp or sob, pt had a panic attack on a day while stressed out with classwork.  pt felt like someone touching his chest. pt says drank water lasting all night his symptoms. pt says hr increased and panicked with number.  pt did not have pain. pt WENT TO [facility] and had bloodwork and had negative troponin. PT HAD DRANK A RED BULL THAT DAY.

## 2024-08-09 NOTE — HISTORY OF PRESENT ILLNESS
[FreeTextEntry1] : Pt with Cath 3/27/24: OM1 S/P PCI, RCA 70% (GDMT unless symptoms) DM, HTN, HLD. erectile dysfunction, tachycardia  pt bp has been labile, pt ekg with nsst changes. pt says bp has been high at times and was checked. pt denies cp or sob. pt complains fullness on left side at rest sometimes improve throughout the day. pt denies palpitations or lightheadedness. pt says occaionally wakes up with heartbeat pumping at times. no syncope.  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  pt is on meloxicam for shoulder and surgery and advised to stop.  pt on simvastatin 40 mg po qhs   3/7/24:  pt started rosuvastatin last visit.  CTA schedueled on monday.  echo 3/5/24: ST: 98 to 105, e' sept; 0.09 m/s, E/e': 6, LVOT VTI: 14.1 cm, CO: 3.3 l/min, LVEF 56%, Asc Ao: normal size but appears mildly aneursymal vs ST junction.  12/23: A1c: 8.2, LDL 50 on simvastatin prior to starting rosuvastatin in February.  3/5/24: EF: 56%, ST: 98 to 105, e' sept; 0.09 m/s, E/e': 6, LVOT VTI: 14.1 cm, CO: 3.3l/min, DD1, Asc Ao: 3.5 cm normal size but appears mild aneurysmal vs ST junction. hr at home between 98 to 123 at home. pt has CTA next week.   4/11/24:  3/18/24: telephone  CTA: CAC: 1488, mLAd moderate narrowing, RCA mod narrowing, D1: mod to severe narrowing, DISTAL Lcx/om1 mod to severe narrowing.  start aspirin, metoprolol/ranexa.  pt says chest heaviness periodically.  will send to cardiac cath with dr. woodall.  3/27/24: CATH: 90% OM1 s/p PCI, 70%RCA ffr: 0.77 (small vessel) d/w dr. woodall small vessel - PCI if severe symptoms. mLAD: 50%, D1: 50%  pt says very little pressure, PT SAYS RANOLAZINE MAKING HIM FEEL YUCKY. wrist is stable.   5/23/24: As per Dr. Ceja patient can have knee surgery on DAPT Aspirin and Plavix. Patient made aware.   8/9/24:  metoprolol increased to 100 mg daily last visit  7/24: carotid: less than 50% b/l ica.  pt says does not feels sluggish and tired and had metoprolol er 100 mg daily, pt hr is improved. pt denies cp or sob, pt had a panic attack on a day while stressed out with classwork.  pt felt like someone touching his chest. pt says drank water lasting all night his symptoms. pt says hr increased and panicked with number.  pt did not have pain. pt WENT TO [facility] and had bloodwork and had negative troponin. PT HAD DRANK A RED BULL THAT DAY.

## 2024-08-09 NOTE — DISCUSSION/SUMMARY
[FreeTextEntry1] : 70% RCA ffr: 0.77 (small vessel) d/w dr. woodall small vessel - PCI if severe symptoms aorta normal on CTA 2024  continue rosuvastatin 20 mg po qhs  cont aspirin 81  continue metoprolol er to 100 mg daily  STOP RANOLAZINE 3/24 due to feeling bad on medication,  If angina then will start isosorbide  give sl ntg prn  pt to stop metoprolol 8/24 due to tiredness  start bystolic 10 mg daily  get bloodwork/ f/u in 4 months

## 2024-08-21 ENCOUNTER — TRANSCRIPTION ENCOUNTER (OUTPATIENT)
Age: 59
End: 2024-08-21

## 2024-09-09 ENCOUNTER — APPOINTMENT (OUTPATIENT)
Dept: ORTHOPEDIC SURGERY | Facility: AMBULATORY SURGERY CENTER | Age: 59
End: 2024-09-09

## 2024-09-17 ENCOUNTER — APPOINTMENT (OUTPATIENT)
Dept: ORTHOPEDIC SURGERY | Facility: CLINIC | Age: 59
End: 2024-09-17

## 2024-10-08 ENCOUNTER — RX RENEWAL (OUTPATIENT)
Age: 59
End: 2024-10-08

## 2024-11-20 NOTE — ASU PREOP CHECKLIST - RESPIRATORY RATE (BREATHS/MIN)
November 20, 2024    Established patient     Referred by:   No referring provider defined for this encounter.     CHIEF COMPLAINT: FBSE    HISTORY OF PRESENT ILLNESS: .    1.Spots of concern  Location: Rt Lower leg  Duration: Few wks  Signs and symptoms: None  Current treatment: None  Past treatments: None        DERM HISTORY:  History of skin cancer: Yes-BCC-Next to Rt Nostril nare    FAMILY HISTORY:  History of melanoma: No    PAST MEDICAL HISTORY:  Past Medical History:    Asthma (HCC)    BCC (basal cell carcinoma)    right medial cheek    Bell's palsy    Calculus of kidney    Cancer (HCC)    Bladder cancer. Patient of Dr. Ryan    Colon polyp    Diabetes (HCC)    Essential hypertension    Glaucoma    12/31/20 1st visit w/ [facility], patient was on Latanoprost qhs OU since around 2015 per Dr. Guzman     High blood pressure    High cholesterol    Hyperlipidemia    Kidney stone    Pyloric stenosis (HCC)    Gavino Hunt syndrome (geniculate herpes zoster)    Rx with prednisone    Screen for colon cancer    repeat CLN in 5-7 years    Skin cancer    left shoulder        REVIEW OF SYSTEMS:  Constitutional: Denies fever, chills, unintentional weight loss.   Skin as per HPI    Medications  Current Outpatient Medications   Medication Sig Dispense Refill    benzonatate 100 MG Oral Cap Take 1 capsule (100 mg total) by mouth 3 (three) times daily as needed for cough. 18 capsule 0    glimepiride 1 MG Oral Tab Take 1 tablet (1 mg total) by mouth daily with breakfast. 90 tablet 0    Fenofibrate 134 MG Oral Cap Take 134 mg by mouth daily. 90 capsule 1    LISINOPRIL 40 MG Oral Tab TAKE 1 TABLET(40 MG) BY MOUTH DAILY 90 tablet 1    PRAVASTATIN 40 MG Oral Tab TAKE 1 TABLET(40 MG) BY MOUTH EVERY NIGHT 90 tablet 1    hydrALAZINE 10 MG Oral Tab Take 1 tablet (10 mg total) by mouth 4 (four) times daily. 360 tablet 0    levothyroxine 50 MCG Oral Tab Take 1 tablet (50 mcg total) by mouth before breakfast. 90 tablet 0    METFORMIN  MG  Oral Tablet 24 Hr TAKE 2 TABLETS(1000 MG) BY MOUTH TWICE DAILY WITH MEALS (Patient taking differently: Take 2 tablets (1,000 mg total) by mouth 2 (two) times daily with meals. Taking 3 tablets daily) 360 tablet 1    Icosapent Ethyl 1 g Oral Cap Take 1 capsule by mouth 2 (two) times daily. 180 capsule 3    levothyroxine 25 MCG Oral Tab Take 1 tablet (25 mcg total) by mouth before breakfast. 90 tablet 1    latanoprost 0.005 % Ophthalmic Solution INSTILL 1 DROP INTO BOTH EYES EVERY EVEING 3 each 3    acetaminophen 500 MG Oral Tab Take 1 tablet (500 mg total) by mouth every 6 (six) hours as needed for Pain. Takes one at night time. Pt stts he takes for sleep aid      Glucose Blood (FREESTYLE LITE TEST) In Vitro Strip Use test strips to check blood sugar 3 times per day as directed. 300 strip 1    FreeStyle Lancets Does not apply Misc Use lancets to check blood sugar 3 times per day as directed. 300 each 1    amLODIPine 5 MG Oral Tab Take 1 tablet (5 mg total) by mouth 2 (two) times daily. 180 tablet 3    atenolol 50 MG Oral Tab Take 1 tablet (50 mg total) by mouth 2 (two) times daily. 180 tablet 3    JANUVIA 100 MG Oral Tab TAKE 1 TABLET DAILY 90 tablet 3    meclizine 25 MG Oral Tab Take 1 tablet (25 mg total) by mouth 3 (three) times daily as needed. 15 tablet 0    ketoconazole 2 % External Cream Apply to feet 3 times weekly 60 g 11       PHYSICAL EXAM:  Patient declined chaperone   General: awake, alert, no acute distress  Skin: Skin exam was performed today including the following: head and face, scalp, neck, chest (including breasts and axillae), abdomen, back, bilateral upper extremities, bilateral lower extremities, hands, feet, digits, nails. Pertinent findings include:   - Scattered bright red-purple dome-shaped papules on the trunk and extremities   - Scattered light brown stellate macules on sun exposed sites  - Scattered, evenly colored, round brown macules and papules with regular borders on the trunk and  extremities  - Numerous scattered skin-colored and brown, waxy, stuck-on papules and plaques on the trunk and extremities  - frontal scalp with a pink gritty papule    ASSESSMENT & PLAN:  Pathophysiology of diagnoses discussed with patient.  Therapeutic options reviewed. Risks, benefits, and alternatives discussed with patient. Instructions reviewed at length.    #Lentigines  #Seborrheic keratoses   #Cherry angiomas   - Reassurance provided regarding the benign nature of these lesions.    #Multiple benign nevi  - Complete skin exam performed today with no outlier lesions identified   - Reassured patient of benign nature of these lesions.   - Recommend daily photoprotection with broad-spectrum sunscreen, avoidance of sun during peak hours, and sun protective clothing.    - Dermoscopy was used for physical examination of pigmented lesions during today's office visit.    #Actinic Keratosis  - Recommended cryotherapy today   - Discussed side effects including redness, swelling, crusting, and discolortion after treatment, wound care with soap/water and vaseline     - Procedure Note Cryosurgery of pre-malignant lesion(s)  Risks, benefits, alternatives, complications, and personnel required for cryosurgery reviewed with patient. Patient verbalizes understanding and wishes to proceed.   - Cryosurgery performed with Liquid Nitrogen via cryostat spray gun to Actinic Keratosis . 1 lesion(s) treated.   - Patient tolerated well and wound care discussed. Return if lesions fail to fully resolve.    Return to clinic: 6 months or sooner if something concerning arises     Alex Zuluaga MD   16

## 2024-12-13 ENCOUNTER — APPOINTMENT (OUTPATIENT)
Dept: CARDIOLOGY | Facility: CLINIC | Age: 59
End: 2024-12-13
Payer: COMMERCIAL

## 2024-12-13 VITALS
DIASTOLIC BLOOD PRESSURE: 74 MMHG | SYSTOLIC BLOOD PRESSURE: 130 MMHG | WEIGHT: 174 LBS | OXYGEN SATURATION: 98 % | BODY MASS INDEX: 27.97 KG/M2 | HEIGHT: 66 IN | HEART RATE: 94 BPM

## 2024-12-13 DIAGNOSIS — R93.1 ABNORMAL FINDINGS ON DIAGNOSTIC IMAGING OF HEART AND CORONARY CIRCULATION: ICD-10-CM

## 2024-12-13 DIAGNOSIS — E11.21 TYPE 2 DIABETES MELLITUS WITH DIABETIC NEPHROPATHY: ICD-10-CM

## 2024-12-13 DIAGNOSIS — E78.2 MIXED HYPERLIPIDEMIA: ICD-10-CM

## 2024-12-13 DIAGNOSIS — I25.10 ATHEROSCLEROTIC HEART DISEASE OF NATIVE CORONARY ARTERY W/OUT ANGINA PECTORIS: ICD-10-CM

## 2024-12-13 DIAGNOSIS — I10 ESSENTIAL (PRIMARY) HYPERTENSION: ICD-10-CM

## 2024-12-13 DIAGNOSIS — Z98.61 CORONARY ANGIOPLASTY STATUS: ICD-10-CM

## 2024-12-13 DIAGNOSIS — N52.01 ERECTILE DYSFUNCTION DUE TO ARTERIAL INSUFFICIENCY: ICD-10-CM

## 2024-12-13 DIAGNOSIS — R00.0 TACHYCARDIA, UNSPECIFIED: ICD-10-CM

## 2024-12-13 PROCEDURE — G2211 COMPLEX E/M VISIT ADD ON: CPT | Mod: NC

## 2024-12-13 PROCEDURE — 99214 OFFICE O/P EST MOD 30 MIN: CPT

## 2024-12-13 RX ORDER — EMPAGLIFLOZIN 10 MG/1
10 TABLET, FILM COATED ORAL DAILY
Qty: 90 | Refills: 3 | Status: ACTIVE | COMMUNITY
Start: 2024-12-13 | End: 1900-01-01

## 2025-02-10 ENCOUNTER — RX RENEWAL (OUTPATIENT)
Age: 60
End: 2025-02-10

## 2025-06-13 ENCOUNTER — APPOINTMENT (OUTPATIENT)
Dept: CARDIOLOGY | Facility: CLINIC | Age: 60
End: 2025-06-13
Payer: COMMERCIAL

## 2025-06-13 VITALS
OXYGEN SATURATION: 98 % | WEIGHT: 164 LBS | HEIGHT: 66 IN | SYSTOLIC BLOOD PRESSURE: 120 MMHG | DIASTOLIC BLOOD PRESSURE: 76 MMHG | HEART RATE: 90 BPM | BODY MASS INDEX: 26.36 KG/M2

## 2025-06-13 PROCEDURE — G2211 COMPLEX E/M VISIT ADD ON: CPT | Mod: NC

## 2025-06-13 PROCEDURE — 99214 OFFICE O/P EST MOD 30 MIN: CPT

## 2025-06-13 RX ORDER — SILDENAFIL 50 MG/1
50 TABLET ORAL
Qty: 10 | Refills: 5 | Status: ACTIVE | COMMUNITY
Start: 2025-06-13 | End: 1900-01-01

## 2025-06-17 ENCOUNTER — RX RENEWAL (OUTPATIENT)
Age: 60
End: 2025-06-17

## 2025-09-03 ENCOUNTER — RX RENEWAL (OUTPATIENT)
Age: 60
End: 2025-09-03

## 2025-09-03 RX ORDER — ROSUVASTATIN CALCIUM 20 MG/1
20 TABLET, FILM COATED ORAL
Qty: 90 | Refills: 3 | Status: ACTIVE | COMMUNITY
Start: 2025-09-03 | End: 1900-01-01